# Patient Record
Sex: MALE | URBAN - METROPOLITAN AREA
[De-identification: names, ages, dates, MRNs, and addresses within clinical notes are randomized per-mention and may not be internally consistent; named-entity substitution may affect disease eponyms.]

---

## 2021-08-31 ENCOUNTER — OFFICE VISIT (OUTPATIENT)
Dept: URBAN - METROPOLITAN AREA CLINIC 107 | Facility: CLINIC | Age: 26
End: 2021-08-31

## 2023-10-23 ENCOUNTER — OFFICE VISIT (OUTPATIENT)
Dept: URBAN - METROPOLITAN AREA CLINIC 107 | Facility: CLINIC | Age: 28
End: 2023-10-23

## 2024-02-05 ENCOUNTER — OV CON (OUTPATIENT)
Dept: URBAN - METROPOLITAN AREA CLINIC 107 | Facility: CLINIC | Age: 29
End: 2024-02-05
Payer: COMMERCIAL

## 2024-02-05 VITALS
SYSTOLIC BLOOD PRESSURE: 152 MMHG | DIASTOLIC BLOOD PRESSURE: 96 MMHG | WEIGHT: 306 LBS | TEMPERATURE: 98.2 F | BODY MASS INDEX: 40.56 KG/M2 | HEIGHT: 73 IN | HEART RATE: 115 BPM

## 2024-02-05 DIAGNOSIS — K58.0 IRRITABLE BOWEL SYNDROME WITH DIARRHEA: ICD-10-CM

## 2024-02-05 PROBLEM — 197125005: Status: ACTIVE | Noted: 2024-02-05

## 2024-02-05 PROCEDURE — 99204 OFFICE O/P NEW MOD 45 MIN: CPT | Performed by: NURSE PRACTITIONER

## 2024-02-05 RX ORDER — RIFAXIMIN 550 MG/1
1 TABLET TABLET ORAL THREE TIMES A DAY
Qty: 42 TABLET | Refills: 1 | OUTPATIENT
Start: 2024-02-05 | End: 2024-03-04

## 2024-02-05 RX ORDER — LOSARTAN POTASSIUM AND HYDROCHLOROTHIAZIDE 50; 12.5 MG/1; MG/1
1 TABLET TABLET, FILM COATED ORAL ONCE A DAY
Qty: 30 | Status: ACTIVE | COMMUNITY
Start: 2024-02-05

## 2024-02-05 NOTE — HPI-TODAY'S VISIT:
28-year-old gentleman referred by Dr. Sherie Meraz for fecal urgency, presenting to establish care.  Copy of today's visit will be forwarded to the referring provider.  Referral notes are reviewed.  He reported to Dr. Meraz abnormal consistency with postprandial urgency and frequency.  He admitted occasional discomfort but denied hematochezia.  He described a history of hemorrhoidectomy.  He has been experiencing diarrhea predominant bowel habits for several years, characterized as 2 to 8 bowel movements per day. He has urgency with bowel movements. There is no nocturnal defecation. Ironically, he does not have to have bowel movements at work. He has tried probiotics, with initial improvement. Bowel consistency is characterized as Brisol type 6. There has not been any incontinence. Very rarely, maybe 2 or 3 times per year, he can noted red blood per rectum. There is occasionally abdominal pain, but this is rare. No weight loss unintentionally.

## 2024-03-18 ENCOUNTER — OV EP (OUTPATIENT)
Dept: URBAN - METROPOLITAN AREA CLINIC 107 | Facility: CLINIC | Age: 29
End: 2024-03-18

## 2025-07-07 ENCOUNTER — OFFICE VISIT (OUTPATIENT)
Dept: URBAN - METROPOLITAN AREA CLINIC 107 | Facility: CLINIC | Age: 30
End: 2025-07-07